# Patient Record
Sex: MALE | Race: WHITE | NOT HISPANIC OR LATINO | URBAN - NONMETROPOLITAN AREA
[De-identification: names, ages, dates, MRNs, and addresses within clinical notes are randomized per-mention and may not be internally consistent; named-entity substitution may affect disease eponyms.]

---

## 2018-05-09 ENCOUNTER — APPOINTMENT (RX ONLY)
Dept: URBAN - NONMETROPOLITAN AREA CLINIC 13 | Facility: CLINIC | Age: 43
Setting detail: DERMATOLOGY
End: 2018-05-09

## 2018-05-09 PROCEDURE — ? PULSED-DYE LASER

## 2018-05-10 DIAGNOSIS — Z41.9 ENCOUNTER FOR PROCEDURE FOR PURPOSES OTHER THAN REMEDYING HEALTH STATE, UNSPECIFIED: ICD-10-CM

## 2018-05-10 NOTE — PROCEDURE: PULSED-DYE LASER
Cryogen Time (Ms): 30
Price (Use Numbers Only, No Special Characters Or $): 100
Immediate Endpoint: erythema
Cryogen Time (Ms): 0
Delay Time (Ms): 20
Consent: Written consent obtained, risks reviewed including but not limited to crusting, scabbing, blistering, scarring, darker or lighter pigmentary change, incidental hair removal, bruising, and/or incomplete removal.
Detail Level: Zone
Spot Size: 7 mm
Pulse Duration: 10 ms
Post-Procedure Care: Post care reviewed with patient.
Laser Type: Vbeam 595nm
Spot Size: 10 mm
Fluence In J/Cm2 (Optional): 7
Location Override: L cheek redness
Fluence In J/Cm2 (Optional): 7.5
Post-Care Instructions: I reviewed with the patient in detail post-care instructions. Patient should stay away from the sun and wear sun protection until treated areas are fully healed.
Pulse Duration: 1.5 ms
Spot Size: 7 mm pigmented lesion handpiece
Fluence In J/Cm2 (Optional): 10

## 2022-06-21 ENCOUNTER — APPOINTMENT (RX ONLY)
Dept: URBAN - NONMETROPOLITAN AREA CLINIC 13 | Facility: CLINIC | Age: 47
Setting detail: DERMATOLOGY
End: 2022-06-21

## 2022-06-21 DIAGNOSIS — Z41.9 ENCOUNTER FOR PROCEDURE FOR PURPOSES OTHER THAN REMEDYING HEALTH STATE, UNSPECIFIED: ICD-10-CM

## 2022-06-21 PROCEDURE — ? PULSED-DYE LASER

## 2022-06-21 PROCEDURE — ? ADDITIONAL NOTES

## 2022-06-21 NOTE — PROCEDURE: PULSED-DYE LASER
Spot Size: 10 mm
Spot Size: 7 mm
Fluence In J/Cm2 (Optional): 8
Pulse Duration: 1.5 ms
Delay Time (Ms): 20
Pulse Duration: 10 ms
Cryogen Time (Ms): 30
Treated Area: small area
Post-Procedure Care: Post care reviewed with patient.
Cryogen Time (Ms): 0
Detail Level: Zone
Immediate Endpoint: erythema
Consent: Written consent obtained, risks reviewed including but not limited to crusting, scabbing, blistering, scarring, darker or lighter pigmentary change, incidental hair removal, bruising, and/or incomplete removal.
Laser Type: Vbeam 595nm
Price (Use Numbers Only, No Special Characters Or $): 100
Fluence In J/Cm2 (Optional): 7
Post-Care Instructions: I reviewed with the patient in detail post-care instructions. Patient should stay away from the sun and wear sun protection until treated areas are fully healed.
Spot Size: 5 mm
Location Override: L side of cheek, red area
Immediate Endpoint: purpura
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
## 2022-11-09 ENCOUNTER — APPOINTMENT (RX ONLY)
Dept: URBAN - NONMETROPOLITAN AREA CLINIC 13 | Facility: CLINIC | Age: 47
Setting detail: DERMATOLOGY
End: 2022-11-09

## 2022-11-09 DIAGNOSIS — Z41.9 ENCOUNTER FOR PROCEDURE FOR PURPOSES OTHER THAN REMEDYING HEALTH STATE, UNSPECIFIED: ICD-10-CM

## 2022-11-09 PROCEDURE — ? ADDITIONAL NOTES
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
